# Patient Record
Sex: FEMALE | Race: WHITE
[De-identification: names, ages, dates, MRNs, and addresses within clinical notes are randomized per-mention and may not be internally consistent; named-entity substitution may affect disease eponyms.]

---

## 2017-06-07 ENCOUNTER — HOSPITAL ENCOUNTER (OUTPATIENT)
Dept: HOSPITAL 58 - LAB | Age: 27
Discharge: HOME | End: 2017-06-07
Attending: NURSE PRACTITIONER

## 2017-06-07 VITALS — BODY MASS INDEX: 42.9 KG/M2

## 2017-06-07 DIAGNOSIS — R42: ICD-10-CM

## 2017-06-07 DIAGNOSIS — R07.9: Primary | ICD-10-CM

## 2017-06-07 LAB
ALBUMIN SERPL-MCNC: 3.8 G/DL (ref 3.4–5)
ALBUMIN/GLOB SERPL: 1.06 {RATIO}
ALP SERPL-CCNC: 65 U/L (ref 42–98)
ALT SERPL-CCNC: 25 U/L (ref 12–78)
ANION GAP SERPL CALC-SCNC: 12.1 MMOL/L
AST SERPL-CCNC: 23 U/L (ref 15–37)
BASOPHILS # BLD AUTO: 0.1 K/UL (ref 0–0.2)
BASOPHILS NFR BLD AUTO: 0.5 % (ref 0–3)
BILIRUB SERPL-MCNC: 0.27 MG/DL (ref 0–1.2)
BUN SERPL-MCNC: 10 MG/DL (ref 7–18)
BUN/CREAT SERPL: 12.04
CALCIUM SERPL-MCNC: 9.6 MG/DL (ref 8.2–10.2)
CHLORIDE SERPL-SCNC: 110 MMOL/L (ref 98–107)
CHOLEST SERPL-MCNC: 157 MG/DL (ref 0–200)
CHOLEST/HDLC SERPL: 4.1 {RATIO} (ref 4.5–5.5)
CO2 BLD-SCNC: 21 MMOL/L (ref 21–32)
CREAT SERPL-MCNC: 0.83 MG/DL (ref 0.6–1.3)
EOSINOPHIL # BLD AUTO: 0.1 K/UL (ref 0–0.7)
EOSINOPHIL NFR BLD AUTO: 1.2 % (ref 0–7)
GFR SERPLBLD BASED ON 1.73 SQ M-ARVRAT: 81 ML/MIN
GLOBULIN SER CALC-MCNC: 3.6 G/L
GLUCOSE SERPL-MCNC: 90 MG/DL (ref 70–110)
HCT VFR BLD AUTO: 38.6 % (ref 37–47)
HDLC SERPL-MCNC: 38 MG/DL (ref 35–80)
HGB BLD-MCNC: 12.9 G/DL (ref 12–16)
IMM GRANULOCYTES NFR BLD AUTO: 0.4 % (ref 0–5)
LYMPHOCYTES # SPEC AUTO: 2.3 K/UL (ref 0.6–3.4)
LYMPHOCYTES NFR BLD AUTO: 21 % (ref 10–50)
MCH RBC QN: 27.2 PG (ref 27–31)
MCHC RBC AUTO-ENTMCNC: 33.4 G/DL (ref 31.8–35.4)
MCV RBC: 81.4 FL (ref 81–99)
MONOCYTES # BLD AUTO: 0.8 K/UL (ref 0.4–2)
MONOCYTES NFR BLD AUTO: 7.9 % (ref 0–10)
NEUTROPHILS # BLD AUTO: 7.4 K/UL (ref 2–6.9)
NEUTROPHILS NFR BLD AUTO: 69 %
PLATELET # BLD AUTO: 212 10^3/UL (ref 140–440)
POTASSIUM SERPL-SCNC: 4.1 MMOL/L (ref 3.5–5.1)
PROT SERPL-MCNC: 7.4 G/DL (ref 6.4–8.2)
RBC # BLD AUTO: 4.74 10^6/UL (ref 4.2–5.4)
SODIUM SERPL-SCNC: 139 MMOL/L (ref 136–145)
TRIGL SERPL-MCNC: 198 MG/DL (ref 30–150)
VLDLC SERPL CALC-MCNC: 40 MG/DL (ref 2–30)
WBC # BLD AUTO: 10.69 K/UL (ref 4.6–10.2)

## 2017-06-07 PROCEDURE — 93010 ELECTROCARDIOGRAM REPORT: CPT

## 2017-06-07 PROCEDURE — 36415 COLL VENOUS BLD VENIPUNCTURE: CPT

## 2017-06-07 PROCEDURE — 80061 LIPID PANEL: CPT

## 2017-06-07 PROCEDURE — 84443 ASSAY THYROID STIM HORMONE: CPT

## 2017-06-07 PROCEDURE — 80053 COMPREHEN METABOLIC PANEL: CPT

## 2017-06-07 PROCEDURE — 85025 COMPLETE CBC W/AUTO DIFF WBC: CPT

## 2017-06-07 PROCEDURE — 93005 ELECTROCARDIOGRAM TRACING: CPT

## 2018-02-14 ENCOUNTER — HOSPITAL ENCOUNTER (OUTPATIENT)
Dept: HOSPITAL 58 - LAB | Age: 28
Discharge: HOME | End: 2018-02-14
Attending: NURSE PRACTITIONER

## 2018-02-14 VITALS — BODY MASS INDEX: 42.9 KG/M2

## 2018-02-14 DIAGNOSIS — R50.9: Primary | ICD-10-CM

## 2018-02-14 PROCEDURE — 87651 STREP A DNA AMP PROBE: CPT

## 2018-02-14 PROCEDURE — 87804 INFLUENZA ASSAY W/OPTIC: CPT

## 2018-07-04 ENCOUNTER — HOSPITAL ENCOUNTER (OUTPATIENT)
Dept: HOSPITAL 58 - ED | Age: 28
Setting detail: OBSERVATION
LOS: 2 days | Discharge: HOME | End: 2018-07-06
Attending: INTERNAL MEDICINE | Admitting: INTERNAL MEDICINE
Payer: COMMERCIAL

## 2018-07-04 VITALS — BODY MASS INDEX: 44.9 KG/M2

## 2018-07-04 DIAGNOSIS — R10.9: ICD-10-CM

## 2018-07-04 DIAGNOSIS — N39.0: ICD-10-CM

## 2018-07-04 DIAGNOSIS — R35.0: ICD-10-CM

## 2018-07-04 DIAGNOSIS — M54.9: Primary | ICD-10-CM

## 2018-07-04 DIAGNOSIS — R39.15: ICD-10-CM

## 2018-07-04 DIAGNOSIS — I25.10: ICD-10-CM

## 2018-07-04 DIAGNOSIS — D72.829: ICD-10-CM

## 2018-07-04 DIAGNOSIS — I10: ICD-10-CM

## 2018-07-04 DIAGNOSIS — N10: ICD-10-CM

## 2018-07-04 DIAGNOSIS — B96.20: ICD-10-CM

## 2018-07-04 DIAGNOSIS — N20.0: ICD-10-CM

## 2018-07-04 DIAGNOSIS — R11.2: ICD-10-CM

## 2018-07-04 DIAGNOSIS — Z72.0: ICD-10-CM

## 2018-07-04 PROCEDURE — 74176 CT ABD & PELVIS W/O CONTRAST: CPT

## 2018-07-04 PROCEDURE — 99217: CPT

## 2018-07-04 PROCEDURE — 96375 TX/PRO/DX INJ NEW DRUG ADDON: CPT

## 2018-07-04 PROCEDURE — 87186 SC STD MICRODIL/AGAR DIL: CPT

## 2018-07-04 PROCEDURE — 87086 URINE CULTURE/COLONY COUNT: CPT

## 2018-07-04 PROCEDURE — 99220: CPT

## 2018-07-04 PROCEDURE — 36415 COLL VENOUS BLD VENIPUNCTURE: CPT

## 2018-07-04 PROCEDURE — 99284 EMERGENCY DEPT VISIT MOD MDM: CPT

## 2018-07-04 PROCEDURE — 85025 COMPLETE CBC W/AUTO DIFF WBC: CPT

## 2018-07-04 PROCEDURE — 81025 URINE PREGNANCY TEST: CPT

## 2018-07-04 PROCEDURE — 96365 THER/PROPH/DIAG IV INF INIT: CPT

## 2018-07-04 PROCEDURE — 80053 COMPREHEN METABOLIC PANEL: CPT

## 2018-07-04 PROCEDURE — 81001 URINALYSIS AUTO W/SCOPE: CPT

## 2018-07-04 PROCEDURE — 99226: CPT

## 2018-07-04 RX ADMIN — SODIUM CHLORIDE SCH MLS/HR: 0.9 INJECTION, SOLUTION INTRAVENOUS at 12:46

## 2018-07-04 NOTE — CT
EXAM:  CT of the abdomen pelvis without contrast 

  

History:  Left flank pain and dysuria 

  

Comparison:  CT abdomen pelvis 12/14/2012 

  

Technique:  Multiplanar CT images through the abdomen pelvis were obtained without the administration
 of IV contrast 

  

Findings:  All the lung bases are clear.  No acute osseous abnormalities. 

  

No discrete gallstones identified by CT.  No focal liver or splenic lesions.  No renal stones and no 
hydronephrosis.  No peripancreatic inflammation.  Adrenal glands are unremarkable.  No ureteral calcu
li.  Evaluation of the renal parenchyma is limited due to lack of IV contrast.  There is mild promine
nce of the left renal pelvis and left ureter.  The appendix is not dilated or inflamed.  No bowel obs
truction.  No bladder wall thickening.  Adnexal structures appear appropriate for patient's age.  No 
perirectal inflammation.  No free air and no ascites. 

  

Impression:  Mild prominence of the left renal pelvis and left ureter could be due to a recently pass
ed stone or urinary tract infection.  There are no renal or ureteral calculi and there is no hydronep
hrosis.

## 2018-07-04 NOTE — ED.PDOC
General


ED Provider: 


Dr. LISE SALCIDO





Chief Complaint: Back Pain


Stated Complaint: back pain/ left  flank pain


Time Seen by Physician: 09:22


Mode of Arrival: Walk-In


Information Source: Patient


Exam Limitations: No limitations


[facility] 


[facility]





Nursing and Triage Documentation Reviewed and Agree: Yes


Does patient meet sepsis criteria?: Yes


If yes, has appropriate treatment been initiated?: No (nurse present  through 

out exam , denied trauma )


System Inflammatory Response Syndrome: Not Applicable


Sepsis Protocol: 


For patient's 13 years and over:





Temp is 96.8 and below  and greater


Pulse >90 BPM


Resp >20/minute


Acutely Altered Mental Status





Are patient's symptoms suggestive of a new infection, such as:


   -Pneumonia


   -Skin, Soft Tissue


   -Endocarditis


   -UTI


   -Bone, Joint Infection


   -Implantable Device


   -Acute Abdominal Infection


   -Wound Infection


   -Meningitis


   -Blood Stream Catheter Infection


   -Unknown








Musculoskeletal Complaint Exam





- Back Pain Complaint/Exam


Mechanism of Injury: Reports: No known trauma, Other (dysuria no blood )


Onset/Duration: 1 day


Timing: Constant


Episodes Lasting: Hours


Initial Severity: Moderate


Current Severity: Moderate


Location: Reports: Discrete (left flank)


Character: Reports: Aching


Aggravating: Reports: None


Alleviating: Reports: None


Associated Signs and Symptoms: Reports: Flank pain.  Denies: Swelling, Redness, 

Bruising, Fever, Weakness, Numbness, Tingling, Abdominal pain, Bladder 

incontinence, Bowel incontinence, Weight loss, Pain with weight bearing


TAD Risk Factors: Reports: None


AAA Risk Factors: Reports: None


Cauda Equina Risk Factors: Reports: None


Epidural Abcess Risk Factors: Reports: None


Related Surgical History: Reports: None


Focal Tenderness: No


Paraspinal Muscle Tenderness: No


Paraspinal Muscle Spasm: No


Scoliosis: No


Lordosis: No


Kyphosis: No


SLR Test: Right Negative, Left Negative


Hip Motion Testing Pain: Right Negative, Left Negative


Focal Weakness: Present: None


Focal Sensory Loss: Present: None


Differential Diagnoses: Renal Colic, Strain, Sprain, Other (uti)





Review of Systems





- Review Of Systems


Constitutional: Reports: No symptoms


Eyes: Reports: No symptoms


Ears, Nose, Mouth, Throat: Reports: No symptoms


Respiratory: Reports: No symptoms


Cardiac: Reports: No symptoms


GI: Reports: No symptoms


: Reports: No symptoms


Musculoskeletal: Reports: Back pain (left flank)


Skin: Reports: No symptoms


Neurological: Reports: No symptoms


Endocrine: Reports: No symptoms


Hematologic/Lymphatic: Reports: No symptoms


All Other Systems: Reviewed and Negative





Past Medical History





- Past Medical History


Previously Healthy: Yes


Endocrine: Reports: None


Cardiovascular: Reports: None


Respiratory: Reports: None


Hematological: Reports: None


Gastrointestinal: Reports: None


Genitourinary: Reports: None


Neuro/Psych: Reports: None


Musculoskeletal: Reports: None


Cancer: Reports: None


Last Menstrual Period: last week





- Surgical History


General Surgical History: Reports: None





- Family History


Family History: Reports: None





- Social History


Smoking Status: Current every day smoker


Hx Substance Use: No


Alcohol Screening: None





Physical Exam





- Physical Exam


Appearance: Well-appearing, No pain distress, Well-nourished


Eyes: KATHY, EOMI, Conjunctiva clear


ENT: Ears normal, Nose normal, Oropharynx normal


Respiratory: Airway patent, Breath sounds clear, Breath sounds equal, 

Respirations nonlabored


Cardiovascular: RRR, Pulses normal, No rub, No murmur


GI/: Soft, Nontender, No masses, Bowel sounds normal, No Organomegaly


Musculoskeletal: Normal strength, ROM intact, No edema, No calf tenderness


Skin: Warm, Dry, Normal color


Neurological: Sensation intact, Motor intact, Reflexes intact, Cranial nerves 

intact, Alert, Oriented


Psychiatric: Affect appropriate, Mood appropriate





Physician Notification





- Case Discussed


Physician Notified: mirian


Time of Notification: 10:45


Admit To: Inpatient





Critical Care Note





- Critical Care Note


Total Time (mins): 0





Course





- Course


Hematology/Chemistry: 


 07/04/18 09:50





 07/04/18 09:50


Orders, Labs, Meds: 





Lab Review











  07/04/18 07/04/18 07/04/18





  09:30 09:30 09:50


 


WBC    14.65 H


 


RBC    4.99


 


Hgb    13.2


 


Hct    41.0


 


MCV    82.2


 


MCH    26.5 L


 


MCHC    32.2


 


RDW Coeff of Elis    12.9


 


Plt Count    279


 


Immature Gran % (Auto)    0.5


 


Neut % (Auto)    76.5


 


Lymph % (Auto)    15.1


 


Mono % (Auto)    6.6


 


Eos % (Auto)    0.9


 


Baso % (Auto)    0.4


 


Immature Gran # (Auto)    0.1


 


Neut # (Auto)    11.2 H


 


Lymph # (Auto)    2.2


 


Mono # (Auto)    1.0


 


Eos # (Auto)    0.1


 


Baso # (Auto)    0.1


 


Sodium   


 


Potassium   


 


Chloride   


 


Carbon Dioxide   


 


Anion Gap   


 


BUN   


 


Creatinine   


 


Estimated GFR (MDRD)   


 


BUN/Creatinine Ratio   


 


Glucose   


 


Calcium   


 


Total Bilirubin   


 


AST   


 


ALT   


 


Alkaline Phosphatase   


 


Total Protein   


 


Albumin   


 


Globulin   


 


Albumin/Globulin Ratio   


 


Urine Color  Yellow  


 


Urine Clarity  Cloudy  


 


Urine pH  5.5  


 


Ur Specific Gravity  1.020  


 


Urine Protein  2+  


 


Urine Glucose (UA)  Negative  


 


Urine Ketones  Negative  


 


Urine Blood  2+  


 


Urine Nitrite  Negative  


 


Urine Bilirubin  Negative  


 


Urine Urobilinogen  0.2  


 


Ur Leukocyte Esterase  1+  


 


Urine Microscopic WBC  Tntc  


 


Ur Squamous Epith Cells  Not present  


 


Urine Pregnancy Test   Negative 














  07/04/18





  09:50


 


WBC 


 


RBC 


 


Hgb 


 


Hct 


 


MCV 


 


MCH 


 


MCHC 


 


RDW Coeff of Elis 


 


Plt Count 


 


Immature Gran % (Auto) 


 


Neut % (Auto) 


 


Lymph % (Auto) 


 


Mono % (Auto) 


 


Eos % (Auto) 


 


Baso % (Auto) 


 


Immature Gran # (Auto) 


 


Neut # (Auto) 


 


Lymph # (Auto) 


 


Mono # (Auto) 


 


Eos # (Auto) 


 


Baso # (Auto) 


 


Sodium  138


 


Potassium  4.1


 


Chloride  107


 


Carbon Dioxide  22


 


Anion Gap  13.1


 


BUN  16


 


Creatinine  0.93


 


Estimated GFR (MDRD)  71.00


 


BUN/Creatinine Ratio  17.20


 


Glucose  100


 


Calcium  9.3


 


Total Bilirubin  0.4


 


AST  14 L


 


ALT  20


 


Alkaline Phosphatase  67


 


Total Protein  7.8


 


Albumin  3.8


 


Globulin  4.0


 


Albumin/Globulin Ratio  0.95


 


Urine Color 


 


Urine Clarity 


 


Urine pH 


 


Ur Specific Gravity 


 


Urine Protein 


 


Urine Glucose (UA) 


 


Urine Ketones 


 


Urine Blood 


 


Urine Nitrite 


 


Urine Bilirubin 


 


Urine Urobilinogen 


 


Ur Leukocyte Esterase 


 


Urine Microscopic WBC 


 


Ur Squamous Epith Cells 


 


Urine Pregnancy Test 








Orders











 Category Date Time Status


 


 ED IV/MEDIPORT/POWERPORT .ONCE EMERGENCY  07/04/18 09:38 Ordered


 


 CBC W/ AUTO DIFF Stat LAB  07/04/18 09:38 Ordered


 


 COMPREHENSIVE METABOLIC PANEL Stat LAB  07/04/18 09:38 Ordered


 


 URINALYSIS C & S IF INDICATED Stat LAB  07/04/18 09:38 Uncollected


 


 URINE CULTURE Stat LAB  07/04/18 10:01 Received


 


 URINE PREGNANCY Stat LAB  07/04/18 09:39 Uncollected


 


 0.9 % Sodium Chloride [Saline Flush] MEDS  07/04/18 09:38 Ordered





 1 syr IVF PRN PRN   


 


 Ketorolac Tromethamine [Toradol] MEDS  07/04/18 09:38 Stat





 30 mg IVP ONCE STA   


 


 Ondansetron HCl/Pf [Zofran 4 mg/2 ml] MEDS  07/04/18 09:38 Stat





 4 mg IVP ONCE STA   


 


 CT ABD/PEL WO RENAL STONE PROT Stat RADS  07/04/18 09:39 Ordered








Medications











Generic Name Dose Route Start Last Admin





  Trade Name Freq  PRN Reason Stop Dose Admin


 


Sodium Chloride  1 syr  07/04/18 09:38  07/04/18 09:56





  Saline Flush  IVF   1 syr





  PRN PRN   Administration





  To flush IV   





     





     





     














Discontinued Medications














Generic Name Dose Route Start Last Admin





  Trade Name Freq  PRN Reason Stop Dose Admin


 


Ketorolac Tromethamine  30 mg  07/04/18 09:38  07/04/18 09:56





  Toradol  IVP  07/04/18 09:39  30 mg





  ONCE STA   Administration





     





     





     





     


 


Ondansetron HCl  4 mg  07/04/18 09:38  07/04/18 09:56





  Zofran 4 Mg/2 Ml  IVP  07/04/18 09:39  4 mg





  ONCE STA   Administration





     





     





     





     











Vital Signs: 





 











  Temp Pulse Resp BP Pulse Ox


 


 07/04/18 09:22  97.6 F  67  20  145/91 H  99














Departure





- Departure


Time of Disposition: 10:44


Disposition: HOME SELF-CARE


Discharge Problem: 


 Pyelonephritis





Instructions:  Urinary Tract Infection in Women (ED)


Condition: Good


Pt referred to PMD for follow-up: Yes


IPMP verified?: No


Additional Instructions: 


Please call your Family Physician as soon as possible to schedule a follow-up 

appointment.


Allergies/Adverse Reactions: 


Allergies





Iodinated Contrast- Oral and IV Dye [Iodinated Contrast Media - IV Dye] Allergy 

(Intermediate, Verified 07/04/18 09:26)


 


methylprednisolone [From Medrol] Allergy (Verified 07/04/18 09:26)


 








Home Medications: 


Ambulatory Orders





1 [No Reported Medications]  07/04/18 








Disposition Discussed With: Patient
Benefits, risks, and possible complications of procedure explained to patient/caregiver who verbalized understanding and gave written consent.

## 2018-07-05 RX ADMIN — SODIUM CHLORIDE SCH MLS/HR: 0.9 INJECTION, SOLUTION INTRAVENOUS at 01:01

## 2018-07-05 RX ADMIN — SODIUM CHLORIDE SCH MLS/HR: 0.9 INJECTION, SOLUTION INTRAVENOUS at 14:25

## 2018-07-06 VITALS — DIASTOLIC BLOOD PRESSURE: 77 MMHG | TEMPERATURE: 97.8 F | SYSTOLIC BLOOD PRESSURE: 131 MMHG

## 2018-07-06 RX ADMIN — SODIUM CHLORIDE SCH MLS/HR: 0.9 INJECTION, SOLUTION INTRAVENOUS at 04:29

## 2018-07-06 NOTE — PN
DATE OF SERVICE:  07/05/18



SUBJECTIVE: 

Left-sided lower abdominal pain is somewhat better. Urine has so far grown 
heavy growth of gram negative rods. No fever since admission. 



REVIEW OF SYSTEMS:  

CONSTITUTIONAL: No fever, no chills.  

HEENT:  Normal.

ENDOCRINE: No weight gain, no weight loss.

CVS:  No angina symptoms. No CHF symptoms.  No palpitations.  No atypical chest 
pain for CAD.  No shortness of breath. No PND, no orthopnea. 

RESPIRATORY:  No cough, no hemoptysis. 

GI:  No nausea, no vomiting.  No abdominal pain. 

:  No hematuria. No polyuria. 

MUSCULOSKELETAL:  No joint swelling. 

PSYCHIATRIC: Not anxious. No depression. No suicidal thoughts. No homicidal 
thoughts. 

SKIN: Intact. No rash. 



PHYSICAL EXAMINATION:  

V/S: /75, respiratory rate 16, heart rate 68, temperature 98.2, 
saturation 97. 

HEENT: Normocephalic, atraumatic. Mucosa dry, pallor positive.  No icterus. 

NECK:  Supple.  No JVD, no carotid bruit. No lymphadenopathy.

LUNGS: Decreased breath sounds with basilar crackles. No rales or rhonchi. 

HEART:  S1, S2 normal. No S3. No murmur, gallop or regurgitation. 

ABDOMEN: Soft, nontender. Bowel sounds active. No rigidity. No rebound or 
guarding. Left CVA tenderness is present. 

EXTREMITIES:   No cyanosis, clubbing or pedal edema.

MUSCULOSKELETAL:  No joint swelling. 

NEUROLOGIC: Awake, alert, oriented times three.  No focal deficit. 

LYMPHATIC: No lymph nodes palpable. 

SKIN: Intact.



LABS:

Sodium 139, potassium 4.1, chloride 111. Bicarb 21. BUN 16, creatinine 0.82. 
Glucose 100. White count 9.43, hemoglobin 11.2, hematocrit 36.7, platelet count 
250. 



ASSESSMENT: 

1.    ACUTE LEFT-SIDED PYELONEPHRITIS

2.    DECREASE IN HEMOGLOBIN MOST LIKELY FROM HEMODILUTION

3.    HYPERTENSION



PLAN:

1.  Continue the Toradol, Rocephin and IV fluids

2.  Will continue to monitor the patient



TIME SPENT: More than 35 minutes
MTDD

## 2018-07-06 NOTE — HP
DATE OF SERVICE: 18



CHIEF COMPLAINT:

Back pain and the left flank pain. 



HISTORY OF PRESENT ILLNESS:

This is a 28 year old female came to the emergency room on  with 3-4 
days onset of the burning, frequency of urination, urgency of urination and 
started hurting in the left lower back and left sided flank. Nausea, vomited 
four times and not able to keep anything down. The patient was seen by Dr. Huffman in the emergency room. The patient's WBC was 14,000 with left shift. 
Urine showed 2+ blood, nitrate negative, leukocyte esterase positive. CT 
abdomen and pelvis showed the left ureter being thickened and question of 
passing kidney stone versus infection, cloudy urine. With the given CVA  
tenderness, cloudy urine and left flank pain and elevated WBC the patient was 
admitted to the hospital for the pyelonephritis for IV antibiotics, pain 
medication and IV fluids. 



REVIEW OF SYSTEMS: 

CONSTITUTIONAL:  No fever, no chills.

HEENT: Normal. 

ENDOCRINE: No weight gain; no weight loss.

CVS:  No chest pain. No PND, no orthopnea.  No shortness of breath. No PND, no 
orthopnea.

RESPIRATORY:  No cough, no congestion. No hemoptysis. 

GI: Nausea, no vomiting. Abdominal pain. No melena. Left lower quadrant pain. 

: No hematuria. No polyuria. Burning and frequency of urination. 

MUSCULOSKELETAL: No joint swelling.  

PSYCHIATRIC: Not anxious. No depression.  No suicidal thoughts. No homicidal 
thoughts.

SKIN: Intact, no open lesions. 



PAST MEDICAL HISTORY:  

Hypertension 

History of kidney stone

Nicotine use 



PAST SURGICAL HISTORY:

Tubal ligation 

 



PERSONAL HISTORY:

The patient does smoke, no alcohol and no drugs. 



FAMILY HISTORY:

Diabetes and heart problems 



MEDICATIONS:

None 



ALLERGIES: 

Iodinated contrast 

Methylprednisolone 



PHYSICAL EXAMINATION:  

V/S: Blood pressure 145/91, respiratory rate 20, heart rate 67, temperature 97.6
, saturation 99%. 

GENERAL: Sick looking lady laying in the bed. Mucosa Dry. Pallor positive. 

HEENT:  Atraumatic, normocephalic. No scleral icterus.  Pallor positive.  
Mucosa dry. 

NECK:   Supple. No JVD, no bruit. No lymphadenopathy. No thyromegaly. 

HEART:  S1, S2 normal. No murmur.  No cyanosis or clubbing. No ascites. 

LUNGS:  Clear to auscultation.  No rales or rhonchi. 

ABDOMEN: Soft, nontender.  Bowel sounds are active. Left sided CVA tenderness 
and left lower quadrant tenderness present. No rigidity or guarding. 

EXTREMITIES:  No pedal edema. No cyanosis or clubbing

MUSCULOSKELETAL: Normal joints, no swelling. 

NEUROLOGIC: The patient is awake and alert. 

SKIN:  Intact; no open lesions. 

LYMPHATIC: No lymph nodes palpable. 



LABS:

WBC 14.65, hgb 13.2, hct 41.4, plt count 279, sodium 138, potassium 4.1, 
chloride 107, bicarb 22, BUN 16, creatinine 0.93 and glucose 100. Urine cloudy, 
2+ protein, 2+ blood, 1+ leukocyte esterase. CT scan of the abdomen and pelvis 
showed the mild prominence of the left renal pelvis and ureter could be due to 
recently pass stone or urinary tract infection.  



ASSESSMENT:

1.  Left sided acute pyelonephritis 

2.  Leukocytosis from urinary tract infection 

3.  Hypertension 



PLAN:

1.  Admit the patient to the regular floor 

2.  CBC and CMP today and daily 

3.  IV fluids 

4.  Rocephin 1 gram daily 

5.  Zofran 

6.  Toradol 



TIME SPENT: MORE THAN 75 minutes

MTDD

## 2018-07-07 NOTE — PCM.HOSP
- Observation Care Discharge


[id number] OBS Care Discharge (31699): 7/7





- Initial Observation Care


[id number] High Complexity 70 Minutes (24638): 7/5





- Subsequent Observation Care


[id number] 35 Minutes per Day (60794): 7/6

## 2018-07-12 NOTE — DS
DATE OF SERVICE:  18



FINAL DIAGNOSIS: 

1.   PYELONEPHRITIS, LEFT-SIDED

2.   UTI, ORGANISM E. COLI, SENSITIVE TO BACTRIM

3.   CAD

4.   HYPERTENSION

5.   KIDNEY STONE

6.   TUBAL LIGATION

7.   

8.   SMOKER

9.   HISTORY OF MONONUCLEOSIS



DISCHARGE INSTRUCTIONS:

1.  Discharge the patient home.

2.  Followup appointment with Dr. Gordon on Wednesday,  at 0930.



MEDICATIONS AT DISCHARGE:

None



NEW PRESCRIPTIONS:

Bactrim DS take one tablet two times a day for 5 days



DIET INSTRUCTIONS: 

Heart Healthy, as tolerated. Drink plenty of fluids especially water. 



ACTIVITY:

As tolerated. 



DISEASE SPECIFIC EDUCATION:

Increase hydration.  Please take probiotics and yogurt. Urinary tract infection 
and dehydration have been discussed, verbalized understanding. 



HOSPITAL COURSE:

This is a 28-year-old female who came to the emergency room with flank pain, 
abdominal pain, left-sided flank pain and abdominal pain.  No fever. White 
count 14,000 with left shift.  CT of abdomen and pelvis showed questionable 
ureteric inflammation and bladder inflammation. With the given left-sided flank 
pain and CVA tenderness. The patient admitted with left-sided pyelonephritis 
started on IV antibiotic Rocephin, IV fluids, Toradol for the pain. Gradually 
the patient started feeling better, no more nausea. She was able to eat and 
drink well. Hemoglobin dropped from 13.2 to 11.9 and 11.4. Chemistries were 
normal. No fever. Urine grew E. Coli. Pregnancy test negative. Bacteria was 
sensitive to the Bactrim. At that time, the patient was discharged home. 



TIME SPENT: MORE THAN 65 MINUTES
MTDD

## 2018-10-19 ENCOUNTER — HOSPITAL ENCOUNTER (EMERGENCY)
Dept: HOSPITAL 58 - ED | Age: 28
Discharge: HOME | End: 2018-10-19

## 2018-10-19 VITALS — SYSTOLIC BLOOD PRESSURE: 151 MMHG | DIASTOLIC BLOOD PRESSURE: 92 MMHG | TEMPERATURE: 99.2 F

## 2018-10-19 VITALS — BODY MASS INDEX: 39.9 KG/M2
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
## 2018-10-19 DIAGNOSIS — S61.012A: Primary | ICD-10-CM

## 2018-10-19 DIAGNOSIS — F17.210: ICD-10-CM

## 2018-10-19 PROCEDURE — 99283 EMERGENCY DEPT VISIT LOW MDM: CPT

## 2018-10-19 NOTE — ED.PDOC
General


ED Provider: 


Dr. MAGO SWEENEY-ER





Chief Complaint: Finger Laceration


Stated Complaint: i cut the tip of my thumb with a knife


Time Seen by Physician: 19:35


Mode of Arrival: Walk-In


Information Source: Patient


Exam Limitations: No limitations


Primary Care Provider: 


FRANTZ S[facility]





Nursing and Triage Documentation Reviewed and Agree: Yes


Does patient meet sepsis criteria?: No


System Inflammatory Response Syndrome: Not Applicable


Sepsis Protocol: 


For patient's 13 years and over:





Temp is 96.8 and below  and greater


Pulse >90 BPM


Resp >20/minute


Acutely Altered Mental Status





Are patient's symptoms suggestive of a new infection, such as:


   -Pneumonia


   -Skin, Soft Tissue


   -Endocarditis


   -UTI


   -Bone, Joint Infection


   -Implantable Device


   -Acute Abdominal Infection


   -Wound Infection


   -Meningitis


   -Blood Stream Catheter Infection


   -Unknown








Skin Complaint Exam





- Laceration/Abrasion/Hand Complaint/Exam


Location of Injury: Left, Digit #1


Mechanism of Injury: Sharp trauma


Onset/Duration: one hour


Symptoms Are: Still present


Initial Severity: Mild


Current Severity: Mild


Aggravating: Movement


Alleviating: Compression


Associated Signs and Symptoms: Denies: Fever, Chills, Erythema, Numbness, 

Tingling


Differential Diagnoses: Avulsion





Review of Systems





- Review Of Systems


Constitutional: Reports: No symptoms


Eyes: Reports: No symptoms


Ears, Nose, Mouth, Throat: Reports: No symptoms


Respiratory: Reports: No symptoms


Cardiac: Reports: No symptoms


GI: Reports: No symptoms


: Reports: No symptoms


Musculoskeletal: Reports: No symptoms


Skin: Reports: No symptoms


Neurological: Reports: No symptoms


Endocrine: Reports: No symptoms


Hematologic/Lymphatic: Reports: No symptoms


All Other Systems: Reviewed and Negative





Past Medical History





- Past Medical History


Previously Healthy: Yes


Endocrine: Reports: None


Cardiovascular: Reports: None


Respiratory: Reports: None


Hematological: Reports: None


Gastrointestinal: Reports: None


Genitourinary: Reports: None


Neuro/Psych: Reports: None


Musculoskeletal: Reports: None


Cancer: Reports: None





- Surgical History


General Surgical History: Reports: None





- Family History


Family History: Reports: None





- Social History


Smoking Status: Current some day smoker


Hx Substance Use: No


Alcohol Screening: None





Physical Exam





- Physical Exam


Appearance: Well-appearing, No pain distress, Well-nourished


Eyes: KATHY, EOMI, Conjunctiva clear


ENT: Ears normal, Nose normal, Oropharynx normal


Neck: Supple


Respiratory: Airway patent


Cardiovascular: RRR


GI/: Soft, Nontender, No masses, Bowel sounds normal, No Organomegaly


Musculoskeletal: Normal strength, ROM intact, No edema, No calf tenderness


Skin: Warm, Dry, Normal color (very small 0.5cm laceration tip of her thumb)


Neurological: Sensation intact, Motor intact, Reflexes intact, Cranial nerves 

intact, Alert, Oriented


Psychiatric: Affect appropriate, Mood appropriate





Critical Care Note





- Critical Care Note


Total Time (mins): 0





Course





- Course


Orders, Labs, Meds: 





Orders











 Category Date Time Status


 


 Wound care [ED WOUND CARE] .ONCE EMERGENCY  10/19/18 19:34 Ordered














Departure





- Departure


Time of Disposition: 19:37


Disposition: HOME SELF-CARE


Discharge Problem: 


Avulsion of skin of finger without complication


Qualifiers:


 Encounter type: initial encounter Qualified Code(s): S61.209A - Unspecified 

open wound of unspecified finger without damage to nail, initial encounter





Instructions:  Skin Avulsion (ED)


Condition: Good


Pt referred to PMD for follow-up: Yes


IPMP verified?: No


Additional Instructions: 


change dressing daily till healed--used triple antbx ointment --return if any 

signs of infection


Allergies/Adverse Reactions: 


Allergies





Iodinated Contrast- Oral and IV Dye [Iodinated Contrast Media - IV Dye] Allergy 

(Intermediate, Verified 07/04/18 09:26)


 


methylprednisolone [From Medrol] Allergy (Verified 07/04/18 09:26)


 








Home Medications: 


Ambulatory Orders





Sulfamethoxazole/Trimethoprim [Bactrim Ds 800/160 mg] 1 tab PO Q12HR #10 tablet 

07/06/18 








Disposition Discussed With: Patient

## 2019-01-30 ENCOUNTER — HOSPITAL ENCOUNTER (EMERGENCY)
Dept: HOSPITAL 58 - ED | Age: 29
Discharge: HOME | End: 2019-01-30

## 2019-01-30 VITALS — DIASTOLIC BLOOD PRESSURE: 107 MMHG | TEMPERATURE: 98.3 F | SYSTOLIC BLOOD PRESSURE: 180 MMHG

## 2019-01-30 VITALS — BODY MASS INDEX: 47.3 KG/M2

## 2019-01-30 DIAGNOSIS — F17.210: ICD-10-CM

## 2019-01-30 DIAGNOSIS — N39.0: Primary | ICD-10-CM

## 2019-01-30 PROCEDURE — 81001 URINALYSIS AUTO W/SCOPE: CPT

## 2019-01-30 PROCEDURE — 87186 SC STD MICRODIL/AGAR DIL: CPT

## 2019-01-30 PROCEDURE — 96372 THER/PROPH/DIAG INJ SC/IM: CPT

## 2019-01-30 PROCEDURE — 99283 EMERGENCY DEPT VISIT LOW MDM: CPT

## 2019-01-30 PROCEDURE — 87086 URINE CULTURE/COLONY COUNT: CPT

## 2019-01-30 NOTE — ED.PDOC
General


ED Provider: 


Dr. LISE SALCIDO





Chief Complaint: Urinary Problem


Stated Complaint: urinary sign and symptoms


Time Seen by Physician: 15:20


Mode of Arrival: Walk-In


Information Source: Patient


Exam Limitations: No limitations


Nursing and Triage Documentation Reviewed and Agree: Yes


Does patient meet sepsis criteria?: Yes


If yes, has appropriate treatment been initiated?: No


System Inflammatory Response Syndrome: Not Applicable


Sepsis Protocol: 


For patient's 13 years and over:





Temp is 96.8 and below  and greater


Pulse >90 BPM


Resp >20/minute


Acutely Altered Mental Status





Are patient's symptoms suggestive of a new infection, such as:


   -Pneumonia


   -Skin, Soft Tissue


   -Endocarditis


   -UTI


   -Bone, Joint Infection


   -Implantable Device


   -Acute Abdominal Infection


   -Wound Infection


   -Meningitis


   -Blood Stream Catheter Infection


   -Unknown








 Complaint Exam





- UTI Female Complaint/Exam


Patient Complains of: Reports: Painful urination


Onset/Duration: 3 days


Symptoms Are: Still present


Timing: Intermittent


Initial Severity: Mild


Current Severity: Mild


Location of Pain: Reports: Suprapubic


Associated Signs and Symptoms: Denies: Fever, Chills, Flank pain, Dyspareunia, 

Vaginal discharge


Patient Rh Status: Unknown


Related History: Reports: Similar episode


Related Surgical History: Reports: None


CVA Tenderness: No


Suprapubic Tenderness: Yes


Differential Diagnoses: Cystitis, Other (uti)





Review of Systems





- Review Of Systems


Constitutional: Reports: No symptoms


Eyes: Reports: No symptoms


Ears, Nose, Mouth, Throat: Reports: No symptoms


Respiratory: Reports: No symptoms


Cardiac: Reports: No symptoms


GI: Reports: No symptoms


: Reports: Dysuria


Musculoskeletal: Reports: No symptoms


Skin: Reports: No symptoms


Neurological: Reports: No symptoms


Endocrine: Reports: No symptoms


Hematologic/Lymphatic: Reports: No symptoms


All Other Systems: Reviewed and Negative





Past Medical History





- Past Medical History


Previously Healthy: Yes


Endocrine: Reports: None


Cardiovascular: Reports: None


Respiratory: Reports: None


Hematological: Reports: None


Gastrointestinal: Reports: None


Genitourinary: Reports: None


Neuro/Psych: Reports: None


Musculoskeletal: Reports: None


Cancer: Reports: None


Last Menstrual Period: last week





- Surgical History


General Surgical History: Reports: None





- Family History


Family History: Reports: None





- Social History


Smoking Status: Current some day smoker


Hx Substance Use: No


Alcohol Screening: None





Physical Exam





- Physical Exam


Appearance: Well-appearing, No pain distress, Well-nourished


Eyes: KATHY, EOMI, Conjunctiva clear


ENT: Ears normal, Nose normal, Oropharynx normal


Respiratory: Airway patent, Breath sounds clear, Breath sounds equal, 

Respirations nonlabored


Cardiovascular: RRR, Pulses normal, No rub, No murmur


GI/: Soft, Nontender, No masses, Bowel sounds normal, No Organomegaly


Musculoskeletal: Normal strength, ROM intact, No edema, No calf tenderness


Skin: Warm, Dry, Normal color


Neurological: Sensation intact, Motor intact, Reflexes intact, Cranial nerves 

intact, Alert, Oriented


Psychiatric: Affect appropriate, Mood appropriate





Critical Care Note





- Critical Care Note


Total Time (mins): 0





Course





- Course


Orders, Labs, Meds: 





Lab Review











  01/30/19





  15:30


 


Urine Color  Yellow


 


Urine Clarity  Cloudy


 


Urine pH  5.5


 


Ur Specific Gravity  >=1.030


 


Urine Protein  2+


 


Urine Glucose (UA)  Negative


 


Urine Ketones  Negative


 


Urine Blood  3+


 


Urine Nitrite  Positive


 


Urine Bilirubin  Negative


 


Urine Urobilinogen  0.2


 


Ur Leukocyte Esterase  Negative


 


Urine Microscopic RBC  20-30


 


Ur Squamous Epith Cells  


 


Urine Bacteria  1+








Orders











 Category Date Time Status


 


 URINALYSIS C & S IF INDICATED Stat LAB  01/30/19 15:30 Results


 


 URINE CULTURE Stat LAB  01/30/19 15:30 Received


 


 Ceftriaxone Sodium [Rocephin] MEDS  01/30/19 15:44 Discontinued





 2 gm IM ONCE STA   








Medications














Discontinued Medications














Generic Name Dose Route Start Last Admin





  Trade Name Freq  PRN Reason Stop Dose Admin


 


Ceftriaxone Sodium  2 gm  01/30/19 15:44  





  Rocephin  IM  01/30/19 15:45  





  ONCE STA   





     





     





     





     











Vital Signs: 





 











  Temp Pulse Resp BP Pulse Ox


 


 01/30/19 15:20  98.3 F  66  20  180/107 H  99














Departure





- Departure


Time of Disposition: 15:48


Disposition: HOME SELF-CARE


Discharge Problem: 


 Urinary symptoms, Urinary tract infectious disease





Instructions:  Urinary Tract Infection in Women (ED)


Condition: Good


Pt referred to PMD for follow-up: Yes


IPMP verified?: No


Additional Instructions: 


Please call your Family Physician as soon as possible to schedule a follow-up 

appointment. if you have  , back pain ,nausea, vomiting, abdominal pain, 

passing out, bloody urine cant  urinate this means infection is or may be 

spreading,  see your MD OR RETURN AS SOON AS POSSIBLE TO E.R.  FINISH 

ANTIBIOTICS 


Prescriptions: 


Sulfamethoxazole/Trimethoprim [Bactrim Ds 800/160 mg] 1 tab PO Q12HR #14 tablet


Allergies/Adverse Reactions: 


Allergies





Iodinated Contrast- Oral and IV Dye [Iodinated Contrast Media - IV Dye] Allergy 

(Intermediate, Verified 01/30/19 15:27)


 


methylprednisolone [From Medrol] Allergy (Verified 01/30/19 15:27)


 


Pert Plus Shampoo Adverse Reaction (Uncoded 10/19/18 19:37)


 








Home Medications: 


Ambulatory Orders





Sulfamethoxazole/Trimethoprim [Bactrim Ds 800/160 mg] 1 tab PO Q12HR #14 tablet 

01/30/19 








Disposition Discussed With: Patient